# Patient Record
Sex: FEMALE | Race: WHITE | NOT HISPANIC OR LATINO
[De-identification: names, ages, dates, MRNs, and addresses within clinical notes are randomized per-mention and may not be internally consistent; named-entity substitution may affect disease eponyms.]

---

## 2023-02-03 PROBLEM — Z00.00 ENCOUNTER FOR PREVENTIVE HEALTH EXAMINATION: Status: ACTIVE | Noted: 2023-02-03

## 2023-02-07 ENCOUNTER — APPOINTMENT (OUTPATIENT)
Dept: CARDIOLOGY | Facility: CLINIC | Age: 38
End: 2023-02-07

## 2023-02-28 ENCOUNTER — APPOINTMENT (OUTPATIENT)
Dept: CARDIOLOGY | Facility: CLINIC | Age: 38
End: 2023-02-28
Payer: COMMERCIAL

## 2023-02-28 VITALS
WEIGHT: 140 LBS | DIASTOLIC BLOOD PRESSURE: 80 MMHG | SYSTOLIC BLOOD PRESSURE: 132 MMHG | HEIGHT: 62 IN | BODY MASS INDEX: 25.76 KG/M2

## 2023-02-28 DIAGNOSIS — Z87.891 PERSONAL HISTORY OF NICOTINE DEPENDENCE: ICD-10-CM

## 2023-02-28 DIAGNOSIS — Z78.9 OTHER SPECIFIED HEALTH STATUS: ICD-10-CM

## 2023-02-28 DIAGNOSIS — Z82.49 FAMILY HISTORY OF ISCHEMIC HEART DISEASE AND OTHER DISEASES OF THE CIRCULATORY SYSTEM: ICD-10-CM

## 2023-02-28 PROCEDURE — 99204 OFFICE O/P NEW MOD 45 MIN: CPT | Mod: 25

## 2023-02-28 PROCEDURE — 93000 ELECTROCARDIOGRAM COMPLETE: CPT

## 2023-02-28 RX ORDER — BUSPIRONE HYDROCHLORIDE 5 MG/1
5 TABLET ORAL
Refills: 0 | Status: ACTIVE | COMMUNITY

## 2023-02-28 RX ORDER — INSULIN ASPART 100 [IU]/ML
100 INJECTION, SOLUTION INTRAVENOUS; SUBCUTANEOUS
Refills: 0 | Status: ACTIVE | COMMUNITY

## 2023-02-28 NOTE — HISTORY OF PRESENT ILLNESS
[FreeTextEntry1] : Ms. Keane is a 39yo F with PMHx of DMI, depression who presents to establish care. Her PMD is Dr. Sergei Courtney. She previously followed with Dr. Francisco Marquez, cardiologist in [place]. She is mostly feeling well. She will get some exertional chest "pulling". It will subside with rest. She also at times feels fluttering in her chest and has been told about possible arrhythmia. Denies SOB, lightheadedness, LE edema. \par

## 2023-02-28 NOTE — REASON FOR VISIT
[FreeTextEntry1] : Diagnostic Tests:\par ---------------------\par EKG: \par 02/28/23-VLADIMIR. Low voltage, precordial.

## 2023-02-28 NOTE — REVIEW OF SYSTEMS
[SOB] : no shortness of breath [Dyspnea on exertion] : not dyspnea during exertion [Chest Discomfort] : chest discomfort [Lower Ext Edema] : no extremity edema [Palpitations] : palpitations [Orthopnea] : no orthopnea

## 2023-02-28 NOTE — ASSESSMENT
[FreeTextEntry1] : Chest pain: Given longstanding DM, cannot rule out ischemic cause of symptoms.\par -Check CCTA. \par -Metoprolol pretreatment sent to pharmacy. \par -Check TTE. \par \par HLD: Mildly elevated\par -Has been on statin before but taken off. \par -Patient is <41yo, so unclear if needs to be started currently.\par -Based on CCTA, will decide if starting moderate intensity statin. \par \par DM: HA1c 7.2%\par -Continue with insulin. \par \par Valve disorder: Told she has a leaky valve in the past. \par -Check TTE. \par \par Follow up in 3 months.

## 2023-04-10 ENCOUNTER — APPOINTMENT (OUTPATIENT)
Dept: CARDIOLOGY | Facility: CLINIC | Age: 38
End: 2023-04-10
Payer: COMMERCIAL

## 2023-04-10 PROCEDURE — 93306 TTE W/DOPPLER COMPLETE: CPT

## 2023-06-27 ENCOUNTER — RESULT REVIEW (OUTPATIENT)
Age: 38
End: 2023-06-27

## 2023-06-27 ENCOUNTER — OUTPATIENT (OUTPATIENT)
Dept: OUTPATIENT SERVICES | Facility: HOSPITAL | Age: 38
LOS: 1 days | End: 2023-06-27
Payer: COMMERCIAL

## 2023-06-27 DIAGNOSIS — R07.9 CHEST PAIN, UNSPECIFIED: ICD-10-CM

## 2023-06-27 PROCEDURE — 75574 CT ANGIO HRT W/3D IMAGE: CPT

## 2023-06-27 PROCEDURE — 75574 CT ANGIO HRT W/3D IMAGE: CPT | Mod: 26

## 2023-06-28 DIAGNOSIS — R07.9 CHEST PAIN, UNSPECIFIED: ICD-10-CM

## 2023-08-31 ENCOUNTER — APPOINTMENT (OUTPATIENT)
Dept: CARDIOLOGY | Facility: CLINIC | Age: 38
End: 2023-08-31
Payer: COMMERCIAL

## 2023-08-31 VITALS — TEMPERATURE: 97.6 F | HEIGHT: 62 IN | BODY MASS INDEX: 26.31 KG/M2 | WEIGHT: 143 LBS

## 2023-08-31 VITALS — HEART RATE: 68 BPM | DIASTOLIC BLOOD PRESSURE: 70 MMHG | SYSTOLIC BLOOD PRESSURE: 112 MMHG

## 2023-08-31 DIAGNOSIS — I38 ENDOCARDITIS, VALVE UNSPECIFIED: ICD-10-CM

## 2023-08-31 DIAGNOSIS — R07.9 CHEST PAIN, UNSPECIFIED: ICD-10-CM

## 2023-08-31 PROCEDURE — 93000 ELECTROCARDIOGRAM COMPLETE: CPT

## 2023-08-31 PROCEDURE — 99213 OFFICE O/P EST LOW 20 MIN: CPT | Mod: 25

## 2023-08-31 RX ORDER — ESCITALOPRAM OXALATE 5 MG/1
5 TABLET, FILM COATED ORAL
Refills: 0 | Status: DISCONTINUED | COMMUNITY
End: 2023-08-31

## 2023-08-31 RX ORDER — ESCITALOPRAM OXALATE 10 MG/1
10 TABLET, FILM COATED ORAL
Refills: 0 | Status: ACTIVE | COMMUNITY

## 2023-08-31 RX ORDER — METOPROLOL TARTRATE 50 MG/1
50 TABLET, FILM COATED ORAL
Qty: 2 | Refills: 0 | Status: DISCONTINUED | COMMUNITY
Start: 2023-02-28 | End: 2023-08-31

## 2023-08-31 NOTE — HISTORY OF PRESENT ILLNESS
[FreeTextEntry1] : Ms. Keane is a 37yo F with PMHx of DMI, depression who presents for follow up. Her PMD is Dr. Sergei Courtney. She previously followed with Dr. Francisco Marquez, cardiologist in [place]. She is mostly feeling well. She will get some exertional chest "pulling". It will subside with rest. She also at times feels fluttering in her chest and has been told about possible arrhythmia. Denies SOB, lightheadedness, LE edema.  08/31/23-She is feeling well. CCTA with LAD disease. Awaiting FFR analysis. Discussed labwork.

## 2023-08-31 NOTE — REVIEW OF SYSTEMS
[Chest Discomfort] : chest discomfort [Palpitations] : palpitations [Negative] : Heme/Lymph [SOB] : no shortness of breath [Dyspnea on exertion] : not dyspnea during exertion [Lower Ext Edema] : no extremity edema [Orthopnea] : no orthopnea

## 2023-08-31 NOTE — ASSESSMENT
[FreeTextEntry1] : Chest pain: Given longstanding DM, cannot rule out ischemic cause of symptoms. -CCTA with possible mLAD 50%.  -Will need to send for FFR.  -Otherwise, may need additional ischemic work up.  -Start rosuvastatin 20mg PO daily and Aspirin 81mg PO daily. -Pt also to quit smoking.  -Discussed if any new or worsening symptoms, to go to ED for further eval.   HLD: , TG 80, HDL 74,  (08/23) -Has been on statin before but taken off.  -Start rosuvastatin in light of DM and CAD.  -Will check labwork in 6-8 weeks.    DM: HA1c 7.2%->8.3% (08/23) -Continue with insulin.   Valve disorder: Told she has a leaky valve in the past.  -Mild TR on echo.   Follow up in 3 months.

## 2023-08-31 NOTE — REASON FOR VISIT
36.8 [Other: ____] : [unfilled] [FreeTextEntry1] : Diagnostic Tests: --------------------- EK23-NSR with sinus arrhythmia. Low voltage, precordial.  23-NSR. Low voltage, precordial.  ----------------------- Echo:  04/10/23-TTE: EF 64%. Mild TR.  ----------------------- CT:  23-CCTA: CAC 0. LM none, mLAD 50%, LCx none, RCA none.

## 2023-09-07 ENCOUNTER — OUTPATIENT (OUTPATIENT)
Dept: OUTPATIENT SERVICES | Facility: HOSPITAL | Age: 38
LOS: 1 days | End: 2023-09-07
Payer: COMMERCIAL

## 2023-09-07 ENCOUNTER — RESULT REVIEW (OUTPATIENT)
Age: 38
End: 2023-09-07

## 2023-09-07 PROCEDURE — 0502T: CPT

## 2023-09-07 PROCEDURE — 0504T: CPT

## 2023-09-07 PROCEDURE — 0503T: CPT

## 2023-09-08 DIAGNOSIS — R07.9 CHEST PAIN, UNSPECIFIED: ICD-10-CM

## 2023-09-09 DIAGNOSIS — R07.9 CHEST PAIN, UNSPECIFIED: ICD-10-CM

## 2023-10-07 ENCOUNTER — NON-APPOINTMENT (OUTPATIENT)
Age: 38
End: 2023-10-07

## 2023-12-28 ENCOUNTER — APPOINTMENT (OUTPATIENT)
Dept: CARDIOLOGY | Facility: CLINIC | Age: 38
End: 2023-12-28
Payer: COMMERCIAL

## 2023-12-28 VITALS — TEMPERATURE: 97.6 F | HEIGHT: 62 IN | BODY MASS INDEX: 26.87 KG/M2 | WEIGHT: 146 LBS

## 2023-12-28 VITALS — SYSTOLIC BLOOD PRESSURE: 110 MMHG | DIASTOLIC BLOOD PRESSURE: 80 MMHG | HEART RATE: 92 BPM

## 2023-12-28 DIAGNOSIS — E78.5 HYPERLIPIDEMIA, UNSPECIFIED: ICD-10-CM

## 2023-12-28 DIAGNOSIS — I25.10 ATHEROSCLEROTIC HEART DISEASE OF NATIVE CORONARY ARTERY W/OUT ANGINA PECTORIS: ICD-10-CM

## 2023-12-28 DIAGNOSIS — E10.9 TYPE 1 DIABETES MELLITUS W/OUT COMPLICATIONS: ICD-10-CM

## 2023-12-28 PROCEDURE — 99214 OFFICE O/P EST MOD 30 MIN: CPT | Mod: 25

## 2023-12-28 PROCEDURE — 93000 ELECTROCARDIOGRAM COMPLETE: CPT

## 2023-12-28 RX ORDER — DROSPIRENONE AND ESTETROL 3-14.2(28)
3-14.2 KIT ORAL
Refills: 0 | Status: COMPLETED | COMMUNITY
End: 2023-12-28

## 2023-12-28 RX ORDER — ROSUVASTATIN CALCIUM 20 MG/1
20 TABLET, FILM COATED ORAL DAILY
Qty: 90 | Refills: 3 | Status: ACTIVE | COMMUNITY
Start: 2023-08-31 | End: 1900-01-01

## 2023-12-28 NOTE — HISTORY OF PRESENT ILLNESS
[FreeTextEntry1] : Ms. Keane is a 39yo F with PMHx of DMI, depression who presents for follow up. Her PMD is Dr. Sergei Courtney. She previously followed with Dr. Francisco Marquez, cardiologist in [place]. She is mostly feeling well. She will get some exertional chest "pulling". It will subside with rest. She also at times feels fluttering in her chest and has been told about possible arrhythmia. Denies SOB, lightheadedness, LE edema.  08/31/23-She is feeling well. CCTA with LAD disease. Awaiting FFR analysis. Discussed labwork.  12/28/23-She is feeling well. No new complaints. She had stopped taking levothyroxine which was started by PMD.

## 2023-12-28 NOTE — ASSESSMENT
[FreeTextEntry1] : Chest pain: Given longstanding DM, cannot rule out ischemic cause of symptoms. -CCTA with possible mLAD 50%. Negative FFR.  -Continue rosuvastatin 20mg PO daily and Aspirin 81mg PO daily. -Pt also to quit smoking. -Discussed if any new or worsening symptoms, to go to ED for further eval.  HLD: , TG 80, HDL 74,  (08/23) -Has been on statin before but taken off. -Continue rosuvastatin in light of DM and CAD. -Check labs. Full cholesterol panel not resulted.   DM: HA1c 7.2%->8.3% (08/23) -Continue with insulin. -Repeat HA1c.   Valve disorder: Told she has a leaky valve in the past. -Mild TR on echo.  Hypothyroid: TSH elevated with low T3.  -Repeat labs.  -Will discuss with PMD after labs.   Follow up in 6 months.

## 2023-12-28 NOTE — REASON FOR VISIT
[Other: ____] : [unfilled] [FreeTextEntry1] : Diagnostic Tests: --------------------- EK23-NSR. PAC. Low voltage 23-NSR with sinus arrhythmia. Low voltage, precordial.  23-NSR. Low voltage, precordial.  ----------------------- Echo:  04/10/23-TTE: EF 64%. Mild TR.  ----------------------- CT:  23-CCTA: CAC 0. LM none, mLAD 50%, LCx none, RCA none. FFR negative.

## 2024-02-19 ENCOUNTER — LABORATORY RESULT (OUTPATIENT)
Age: 39
End: 2024-02-19

## 2024-02-20 LAB
CHOLEST SERPL-MCNC: 182 MG/DL
ESTIMATED AVERAGE GLUCOSE: 177 MG/DL
HBA1C MFR BLD HPLC: 7.8 %
HDLC SERPL-MCNC: 76 MG/DL
LDLC SERPL CALC-MCNC: 93 MG/DL
NONHDLC SERPL-MCNC: 106 MG/DL
TRIGL SERPL-MCNC: 67 MG/DL
TSH SERPL-ACNC: 5.44 UIU/ML

## 2024-06-27 ENCOUNTER — APPOINTMENT (OUTPATIENT)
Dept: CARDIOLOGY | Facility: CLINIC | Age: 39
End: 2024-06-27